# Patient Record
Sex: MALE | Race: WHITE | NOT HISPANIC OR LATINO | Employment: FULL TIME | URBAN - NONMETROPOLITAN AREA
[De-identification: names, ages, dates, MRNs, and addresses within clinical notes are randomized per-mention and may not be internally consistent; named-entity substitution may affect disease eponyms.]

---

## 2021-02-01 NOTE — PROGRESS NOTES
"  Assessment & Plan     Tobacco abuse    - nicotine (NICODERM CQ) 21 MG/24HR 24 hr patch; Place 1 patch onto the skin every 24 hours he will call when he is on the  refill and we can drop it down to 14 mg/day patch.    Lipid screening    - Lipid Profile (Chol, Trig, HDL, LDL calc); Future he will come in fasting lipid screening for hyperlipidemia and glucose screening for diabetes.    Screening for diabetes mellitus (DM)    - Glucose; Future    Preventative health care    - CBC with platelets and differential; Future also check a CBC with the labs.  Did discuss his BMI is 32.32.  We discussed diet exercise and making proper food choices and food portions.    Vasectomy evaluation    - UROLOGY ADULT REFERRAL; Future referral for urology for vasectomy.                       BMI:   Estimated body mass index is 32.32 kg/m  as calculated from the following:    Height as of this encounter: 1.854 m (6' 1\").    Weight as of this encounter: 111.1 kg (245 lb).                 SHAUNNA Shipley MD  [facility] [place] Hernandez is a 31 year old who presents to clinic today for the following health issues     HPI   Here to establish care.  He does not smoke but chews tobacco and does e-cigarettes would like to get some Nicorette gum to quit smoking.  Does see a dentist.  He would like to get labs done but he did eat today so come in fasting.  He does not drink alcohol.  He also requests to get a vasectomy.  He has 5 kids.  Overall he has been feeling well works as a  at the [employer].    New Patient/Transfer of Care      Vasectomy    Quit Nicotine     Review of Systems   Constitutional, HEENT, cardiovascular, pulmonary, gi and gu systems are negative, except as otherwise noted.      Objective    /64 (BP Location: Left arm, Patient Position: Sitting, Cuff Size: Adult Large)   Pulse 76   Temp 97.4  F (36.3  C) (Tympanic)   Ht 1.854 m (6' 1\")   Wt 111.1 kg (245 lb)   SpO2 98%   BMI " 32.32 kg/m    Body mass index is 32.32 kg/m .  Physical Exam   GENERAL: healthy, alert and no distress  EYES: Eyes grossly normal to inspection, PERRL and conjunctivae and sclerae normal  NECK: no adenopathy, no asymmetry, masses, or scars and thyroid normal to palpation  RESP: lungs clear to auscultation - no rales, rhonchi or wheezes  CV: regular rate and rhythm, normal S1 S2, no S3 or S4, no murmur, click or rub, no peripheral edema and peripheral pulses strong  ABDOMEN: soft, nontender, no hepatosplenomegaly, no masses and bowel sounds normal  MS: no gross musculoskeletal defects noted, no edema

## 2021-02-02 ENCOUNTER — OFFICE VISIT (OUTPATIENT)
Dept: FAMILY MEDICINE | Facility: OTHER | Age: 31
End: 2021-02-02
Attending: FAMILY MEDICINE
Payer: COMMERCIAL

## 2021-02-02 VITALS
HEIGHT: 73 IN | TEMPERATURE: 97.4 F | OXYGEN SATURATION: 98 % | HEART RATE: 76 BPM | BODY MASS INDEX: 32.47 KG/M2 | DIASTOLIC BLOOD PRESSURE: 64 MMHG | SYSTOLIC BLOOD PRESSURE: 108 MMHG | WEIGHT: 245 LBS

## 2021-02-02 DIAGNOSIS — Z00.00 PREVENTATIVE HEALTH CARE: ICD-10-CM

## 2021-02-02 DIAGNOSIS — Z13.1 SCREENING FOR DIABETES MELLITUS (DM): ICD-10-CM

## 2021-02-02 DIAGNOSIS — Z30.09 VASECTOMY EVALUATION: ICD-10-CM

## 2021-02-02 DIAGNOSIS — Z72.0 TOBACCO ABUSE: Primary | ICD-10-CM

## 2021-02-02 DIAGNOSIS — Z13.220 LIPID SCREENING: ICD-10-CM

## 2021-02-02 PROCEDURE — 99203 OFFICE O/P NEW LOW 30 MIN: CPT | Performed by: FAMILY MEDICINE

## 2021-02-02 RX ORDER — NICOTINE 21 MG/24HR
1 PATCH, TRANSDERMAL 24 HOURS TRANSDERMAL EVERY 24 HOURS
Qty: 30 PATCH | Refills: 1 | Status: SHIPPED | OUTPATIENT
Start: 2021-02-02 | End: 2021-05-17

## 2021-02-02 SDOH — HEALTH STABILITY: MENTAL HEALTH: HOW OFTEN DO YOU HAVE A DRINK CONTAINING ALCOHOL?: NOT ASKED

## 2021-02-02 SDOH — HEALTH STABILITY: MENTAL HEALTH: HOW MANY STANDARD DRINKS CONTAINING ALCOHOL DO YOU HAVE ON A TYPICAL DAY?: NOT ASKED

## 2021-02-02 SDOH — HEALTH STABILITY: MENTAL HEALTH: HOW OFTEN DO YOU HAVE 6 OR MORE DRINKS ON ONE OCCASION?: NOT ASKED

## 2021-02-02 ASSESSMENT — PAIN SCALES - GENERAL: PAINLEVEL: NO PAIN (0)

## 2021-02-02 ASSESSMENT — ANXIETY QUESTIONNAIRES
6. BECOMING EASILY ANNOYED OR IRRITABLE: NOT AT ALL
1. FEELING NERVOUS, ANXIOUS, OR ON EDGE: NOT AT ALL
5. BEING SO RESTLESS THAT IT IS HARD TO SIT STILL: NOT AT ALL
7. FEELING AFRAID AS IF SOMETHING AWFUL MIGHT HAPPEN: NOT AT ALL
2. NOT BEING ABLE TO STOP OR CONTROL WORRYING: NOT AT ALL
4. TROUBLE RELAXING: NOT AT ALL
3. WORRYING TOO MUCH ABOUT DIFFERENT THINGS: NOT AT ALL
GAD7 TOTAL SCORE: 0

## 2021-02-02 ASSESSMENT — MIFFLIN-ST. JEOR: SCORE: 2115.19

## 2021-02-02 ASSESSMENT — PATIENT HEALTH QUESTIONNAIRE - PHQ9: SUM OF ALL RESPONSES TO PHQ QUESTIONS 1-9: 0

## 2021-02-02 NOTE — NURSING NOTE
"Chief Complaint   Patient presents with     Establish Care       Initial /64 (BP Location: Left arm, Patient Position: Sitting, Cuff Size: Adult Large)   Pulse 76   Temp 97.4  F (36.3  C) (Tympanic)   Ht 1.854 m (6' 1\")   Wt 111.1 kg (245 lb)   SpO2 98%   BMI 32.32 kg/m   Estimated body mass index is 32.32 kg/m  as calculated from the following:    Height as of this encounter: 1.854 m (6' 1\").    Weight as of this encounter: 111.1 kg (245 lb).  Medication Reconciliation: complete  Chelsea De Souza LPN  "

## 2021-02-03 ASSESSMENT — ANXIETY QUESTIONNAIRES: GAD7 TOTAL SCORE: 0

## 2021-03-12 ENCOUNTER — OFFICE VISIT (OUTPATIENT)
Dept: UROLOGY | Facility: OTHER | Age: 31
End: 2021-03-12
Attending: FAMILY MEDICINE
Payer: COMMERCIAL

## 2021-03-12 VITALS
TEMPERATURE: 97.6 F | HEART RATE: 83 BPM | DIASTOLIC BLOOD PRESSURE: 68 MMHG | SYSTOLIC BLOOD PRESSURE: 122 MMHG | OXYGEN SATURATION: 97 % | RESPIRATION RATE: 16 BRPM

## 2021-03-12 DIAGNOSIS — Z30.09 VASECTOMY EVALUATION: ICD-10-CM

## 2021-03-12 PROCEDURE — 99203 OFFICE O/P NEW LOW 30 MIN: CPT | Performed by: UROLOGY

## 2021-03-12 ASSESSMENT — PAIN SCALES - GENERAL: PAINLEVEL: NO PAIN (0)

## 2021-03-12 NOTE — LETTER
3/12/2021       RE: David Toro  [address]  [place] [address]     Dear Colleague,    Thank you for referring your patient, David Toro, to the [facility] [place] [facility]. Please see a copy of my visit note below.      History     Chief Complaint:      Consult (vasectomy)      HPI   David Toro is a 31 year old male who presents for a vasectomy consultation.  Lino is 31 years of age and has 5 children.  He has a son who visits from a previous relationship and he has 4 children with his current Hartner.  They had a little girl a year ago and so now they feel their family is complete and he is desiring permanent sterilization at this point.  He has had no testicular problems, no testicular surgeries pain or infections.  He feels he do fine with an office procedure and has no concerns about that.    Allergies:    No Known Allergies     Medications:      nicotine (NICODERM CQ) 21 MG/24HR 24 hr patch        Problem List:      There are no active problems to display for this patient.       Past Medical History:      History reviewed. No pertinent past medical history.    Past Surgical History:      History reviewed. No pertinent surgical history.    Family History:      Family History   Problem Relation Age of Onset     Myocardial Infarction Father      Heart Disease Father        Social History:    Marital Status:   [2]  Social History     Tobacco Use     Smoking status: Former Smoker     Types: Cigarettes     Smokeless tobacco: Current User     Types: Chew     Tobacco comment: Ecig   Substance Use Topics     Alcohol use: Not Currently     Drug use: Not Currently        Review of Systems   All other systems reviewed and are negative.        Physical Exam   Vitals:  /68   Pulse 83   Temp 97.6  F (36.4  C) (Tympanic)   Resp 16   SpO2 97%       Physical Exam  Constitutional:       Appearance: Normal appearance.  He is normal weight.   Pulmonary:      Effort: Pulmonary effort is normal.   Abdominal:      General: There is no distension.      Palpations: Abdomen is soft.      Tenderness: There is no abdominal tenderness.   Genitourinary:     Comments: Penis is circumcised meatus glans shaft of the penis are normal.  The left testicle rides higher in the scrotum the testicle feels normal.  No supratesticular masses or inguinal hernias.  The vas can be identified and brought out to the skin.  The right testicle is smooth without any masses, supratesticular masses or inguinal hernias.  The right vas can be isolated and brought to the skin without any difficulty.  Neurological:      Mental Status: He is alert.         ImPression: Vasectomy encounter  Plan   Plan: I have gone over the procedure with him.  We discussed risks of bleeding, hematoma, infection, sperm granuloma and chronic orchialgia. Reanastomosis risk 1:1500. Increased risk of testicular infection.  I also discussed the fact that we send the specimen to pathology to identify the vas if there is a problem with the specimen then we would have to contact him and discuss further treatment.  He is still considered fertile until he brings in 2 - semen analyses and those instructions will be given at the time of the vasectomy.  He also has been given written information about this as well.  He appears to understand and wishes to proceed.         No follow-ups on file.    Anh Pandya MD  [facility] [place]              Again, thank you for allowing me to participate in the care of your patient.      Sincerely,    Anh Pandya MD

## 2021-03-12 NOTE — NURSING NOTE
"Chief Complaint   Patient presents with     Consult     vasectomy       Initial /68   Pulse 83   Temp 97.6  F (36.4  C) (Tympanic)   Resp 16   SpO2 97%  Estimated body mass index is 32.32 kg/m  as calculated from the following:    Height as of 2/2/21: 1.854 m (6' 1\").    Weight as of 2/2/21: 111.1 kg (245 lb).  Medication Reconciliation: complete     Review of Systems:    Weight loss:    No     Recent fever/chills:  No   Night sweats:   No  Current skin rash:  No   Recent hair loss:  No  Heat intolerance:  No   Cold intolerance:  No  Chest pain:   No   Palpitations:   No  Shortness of breath:  No   Wheezing:   No  Constipation:    No   Diarrhea:   No   Nausea:   No   Vomiting:   No   Kidney/side pain:  No   Back pain:   No  Frequent headaches:  No   Dizziness:     No  Leg swelling:   No   Calf pain:    No    Parents, brothers or sisters with history of kidney cancer:   No  Parents, brothers or sisters with history of bladder cancer: No    Leelee Mcintyre LPN    "

## 2021-03-12 NOTE — PROGRESS NOTES
History     Chief Complaint:      Consult (vasectomy)      HPI   David Toro is a 31 year old male who presents for a vasectomy consultation.  Lino is 31 years of age and has 5 children.  He has a son who visits from a previous relationship and he has 4 children with his current Hartner.  They had a little girl a year ago and so now they feel their family is complete and he is desiring permanent sterilization at this point.  He has had no testicular problems, no testicular surgeries pain or infections.  He feels he do fine with an office procedure and has no concerns about that.    Allergies:    No Known Allergies     Medications:      nicotine (NICODERM CQ) 21 MG/24HR 24 hr patch        Problem List:      There are no active problems to display for this patient.       Past Medical History:      History reviewed. No pertinent past medical history.    Past Surgical History:      History reviewed. No pertinent surgical history.    Family History:      Family History   Problem Relation Age of Onset     Myocardial Infarction Father      Heart Disease Father        Social History:    Marital Status:   [2]  Social History     Tobacco Use     Smoking status: Former Smoker     Types: Cigarettes     Smokeless tobacco: Current User     Types: Chew     Tobacco comment: Ecig   Substance Use Topics     Alcohol use: Not Currently     Drug use: Not Currently        Review of Systems   All other systems reviewed and are negative.        Physical Exam   Vitals:  /68   Pulse 83   Temp 97.6  F (36.4  C) (Tympanic)   Resp 16   SpO2 97%       Physical Exam  Constitutional:       Appearance: Normal appearance. He is normal weight.   Pulmonary:      Effort: Pulmonary effort is normal.   Abdominal:      General: There is no distension.      Palpations: Abdomen is soft.      Tenderness: There is no abdominal tenderness.   Genitourinary:     Comments: Penis is circumcised meatus glans shaft of the penis are  normal.  The left testicle rides higher in the scrotum the testicle feels normal.  No supratesticular masses or inguinal hernias.  The vas can be identified and brought out to the skin.  The right testicle is smooth without any masses, supratesticular masses or inguinal hernias.  The right vas can be isolated and brought to the skin without any difficulty.  Neurological:      Mental Status: He is alert.         ImPression: Vasectomy encounter  Plan   Plan: I have gone over the procedure with him.  We discussed risks of bleeding, hematoma, infection, sperm granuloma and chronic orchialgia. Reanastomosis risk 1:1500. Increased risk of testicular infection.  I also discussed the fact that we send the specimen to pathology to identify the vas if there is a problem with the specimen then we would have to contact him and discuss further treatment.  He is still considered fertile until he brings in 2 - semen analyses and those instructions will be given at the time of the vasectomy.  He also has been given written information about this as well.  He appears to understand and wishes to proceed.         No follow-ups on file.    Anh Pandya MD  [facility]

## 2021-04-27 DIAGNOSIS — Z30.2 ENCOUNTER FOR VASECTOMY: ICD-10-CM

## 2021-04-27 DIAGNOSIS — Z30.09 VASECTOMY EVALUATION: Primary | ICD-10-CM

## 2021-05-04 ENCOUNTER — OFFICE VISIT (OUTPATIENT)
Dept: UROLOGY | Facility: OTHER | Age: 31
End: 2021-05-04
Attending: UROLOGY
Payer: COMMERCIAL

## 2021-05-04 VITALS
OXYGEN SATURATION: 98 % | TEMPERATURE: 97.4 F | HEART RATE: 66 BPM | SYSTOLIC BLOOD PRESSURE: 100 MMHG | DIASTOLIC BLOOD PRESSURE: 60 MMHG | BODY MASS INDEX: 32.32 KG/M2 | WEIGHT: 245 LBS

## 2021-05-04 DIAGNOSIS — Z30.09 VASECTOMY EVALUATION: Primary | ICD-10-CM

## 2021-05-04 DIAGNOSIS — Z30.2 ENCOUNTER FOR VASECTOMY: ICD-10-CM

## 2021-05-04 LAB
ALBUMIN UR-MCNC: 10 MG/DL
APPEARANCE UR: CLEAR
BACTERIA #/AREA URNS HPF: ABNORMAL /HPF
BILIRUB UR QL STRIP: NEGATIVE
COLOR UR AUTO: YELLOW
GLUCOSE UR STRIP-MCNC: NEGATIVE MG/DL
HGB UR QL STRIP: NEGATIVE
KETONES UR STRIP-MCNC: NEGATIVE MG/DL
LEUKOCYTE ESTERASE UR QL STRIP: NEGATIVE
MUCOUS THREADS #/AREA URNS LPF: PRESENT /LPF
NITRATE UR QL: NEGATIVE
PH UR STRIP: 5.5 PH (ref 4.7–8)
RBC #/AREA URNS AUTO: <1 /HPF (ref 0–2)
SOURCE: ABNORMAL
SP GR UR STRIP: 1.03 (ref 1–1.03)
SQUAMOUS #/AREA URNS AUTO: 0 /HPF (ref 0–1)
UROBILINOGEN UR STRIP-MCNC: NORMAL MG/DL (ref 0–2)
WBC #/AREA URNS AUTO: 1 /HPF (ref 0–5)

## 2021-05-04 PROCEDURE — 55250 REMOVAL OF SPERM DUCT(S): CPT | Performed by: UROLOGY

## 2021-05-04 PROCEDURE — 88302 TISSUE EXAM BY PATHOLOGIST: CPT | Mod: TC,59 | Performed by: UROLOGY

## 2021-05-04 PROCEDURE — 81001 URINALYSIS AUTO W/SCOPE: CPT | Performed by: UROLOGY

## 2021-05-04 ASSESSMENT — PAIN SCALES - GENERAL: PAINLEVEL: NO PAIN (0)

## 2021-05-04 NOTE — NURSING NOTE
"Chief Complaint   Patient presents with     Sterilization     [facility] First         Initial /60 (BP Location: Left arm, Patient Position: Sitting, Cuff Size: Adult Large)   Pulse 66   Temp 97.4  F (36.3  C) (Tympanic)   Wt 111.1 kg (245 lb)   SpO2 98%   BMI 32.32 kg/m   Estimated body mass index is 32.32 kg/m  as calculated from the following:    Height as of 2/2/21: 1.854 m (6' 1\").    Weight as of this encounter: 111.1 kg (245 lb).  Medication Reconciliation: complete  Rhona Coleman LPN  "

## 2021-05-04 NOTE — PROGRESS NOTES
History     Chief Complaint:    Sterilization ([facility]  )      HPI   David Toro is a 31 year old male who presents for vasectomy.  David was counseled on 3/12/2021.  All of the risks and benefits of vasectomy were discussed at that time he has no further questions wishes to proceed.    Allergies:    No Known Allergies     Medications:      nicotine (NICODERM CQ) 21 MG/24HR 24 hr patch        Problem List:      There are no active problems to display for this patient.       Past Medical History:      History reviewed. No pertinent past medical history.    Past Surgical History:      History reviewed. No pertinent surgical history.    Family History:      Family History   Problem Relation Age of Onset     Myocardial Infarction Father      Heart Disease Father        Social History:    Marital Status:   [2]  Social History     Tobacco Use     Smoking status: Former Smoker     Types: Cigarettes     Quit date: 2/10/2021     Years since quittin.2     Smokeless tobacco: Current User     Types: Chew   Substance Use Topics     Alcohol use: Not Currently     Drug use: Not Currently        Review of Systems   All other systems reviewed and are negative.        Physical Exam   Vitals:  /60 (BP Location: Left arm, Patient Position: Sitting, Cuff Size: Adult Large)   Pulse 66   Temp 97.4  F (36.3  C) (Tympanic)   Wt 111.1 kg (245 lb)   SpO2 98%   BMI 32.32 kg/m        Physical Exam  Procedure:Preoperative diagnosis  Elective sterilization    Postoperative diagnosis  Elective sterilization    Procedure performed  Bilateral vasectomy    Surgeon  Anh Pandya MD    Anesthesia  8 mL lidocaine 2% local injection    Complications  None    EBL  <1 mL    Specimen  Left vas  Right vas    Indications  31 year old male agreed to undergo the above named procedure after discussion of the alternatives, risks and benefits.  Informed consent was obtained.      Procedure  The patient was brought to the  procedure room and placed in supine position.  His scrotum was prepped with Hibiclens and he was draped in a sterile fashion.  A timeout was performed.    Lidocaine 2% was used to anesthetize the scrotal skin as well as perivasal sheath initially on the patient's left.  A 1 cm skin incision was created with the scapel and a vas clamp utilized through this incision to grasp the vas sheath.  The left vas sheath was incised and the vas was dissected out to the skin surface and dissected free of its perivasal sheath.  The vas was clamp, transected and the lumen was cauterized both proximally and distally.  A 3-0 chromic suture was utilized to place the proximal vasal portion under the perivasal sheath, such that the 2 ends were divided by the perivasal sheath (fascial interposition).  This portion of the vas was then allowed to drop back into the left hemiscrotum.  Any bleeders were cauterized. The right side was treated next in the same manner as described above.  The skin incision was closed with the 3-0 chromic suture.  The patient tolerated the procedure well.    It was again reiterated to the patient that he would remain fertile for sometime and should present to the office for a post-vacectomy semen analysis to confirm sterility in 6 weeks and again in 3 months.  He should abstain from ejaculation for 10-14 days.  Ice bag generously.  Tylenol and ibuprofen and take it easy.  If he has any concerns he should contact our office. The patient again expressed understanding.    Impression: Vasectomy encounter    Plan   Plan: Patient was again encouraged to take it easy, ice bag as needed for discomfort.  He is still considered fertile until he brings in 2 - semen analyses and those instructions were given.  He is to call us if he has any concerns or problems.      No follow-ups on file.    Anh Pandya MD  [facility]

## 2021-05-09 LAB — COPATH REPORT: NORMAL

## 2021-06-20 ENCOUNTER — HEALTH MAINTENANCE LETTER (OUTPATIENT)
Age: 31
End: 2021-06-20

## 2021-06-23 ENCOUNTER — TELEPHONE (OUTPATIENT)
Dept: UROLOGY | Facility: OTHER | Age: 31
End: 2021-06-23

## 2021-06-23 NOTE — TELEPHONE ENCOUNTER
David would like a call regarding the sample cup for his 6 week after vasectomy sample.  Does he bring this in or need an appt to be seen.  Please call and advise.  Thank You!

## 2021-07-10 ENCOUNTER — HOSPITAL ENCOUNTER (EMERGENCY)
Facility: HOSPITAL | Age: 31
Discharge: HOME | End: 2021-07-10
Attending: NURSE PRACTITIONER | Admitting: NURSE PRACTITIONER
Payer: COMMERCIAL

## 2021-07-10 VITALS
SYSTOLIC BLOOD PRESSURE: 123 MMHG | TEMPERATURE: 97.5 F | RESPIRATION RATE: 20 BRPM | HEART RATE: 90 BPM | DIASTOLIC BLOOD PRESSURE: 68 MMHG | OXYGEN SATURATION: 95 %

## 2021-07-10 DIAGNOSIS — S05.02XA ABRASION OF LEFT CORNEA, INITIAL ENCOUNTER: ICD-10-CM

## 2021-07-10 PROCEDURE — 99213 OFFICE O/P EST LOW 20 MIN: CPT | Performed by: NURSE PRACTITIONER

## 2021-07-10 PROCEDURE — G0463 HOSPITAL OUTPT CLINIC VISIT: HCPCS

## 2021-07-10 PROCEDURE — 250N000009 HC RX 250: Performed by: NURSE PRACTITIONER

## 2021-07-10 RX ORDER — ERYTHROMYCIN 5 MG/G
0.5 OINTMENT OPHTHALMIC 4 TIMES DAILY
Qty: 3.5 G | Refills: 0 | Status: SHIPPED | OUTPATIENT
Start: 2021-07-10 | End: 2021-07-15

## 2021-07-10 RX ORDER — ERYTHROMYCIN 5 MG/G
OINTMENT OPHTHALMIC ONCE
Status: COMPLETED | OUTPATIENT
Start: 2021-07-10 | End: 2021-07-10

## 2021-07-10 RX ORDER — TETRACAINE HYDROCHLORIDE 5 MG/ML
1-2 SOLUTION OPHTHALMIC ONCE
Status: COMPLETED | OUTPATIENT
Start: 2021-07-10 | End: 2021-07-10

## 2021-07-10 RX ADMIN — TETRACAINE HYDROCHLORIDE 2 DROP: 5 SOLUTION OPHTHALMIC at 19:01

## 2021-07-10 RX ADMIN — ERYTHROMYCIN 1 G: 5 OINTMENT OPHTHALMIC at 19:29

## 2021-07-10 ASSESSMENT — ENCOUNTER SYMPTOMS
PHOTOPHOBIA: 0
LIGHT-HEADEDNESS: 0
VOMITING: 0
HEADACHES: 1
NAUSEA: 0
FEVER: 0
ACTIVITY CHANGE: 1
EYE PAIN: 1
DIZZINESS: 0
EYE DISCHARGE: 1
CHILLS: 0

## 2021-07-10 ASSESSMENT — VISUAL ACUITY
OU: 1
OS: 20/100
OD: 20/50

## 2021-07-10 NOTE — Clinical Note
David Toro was seen and treated in our emergency department on 7/10/2021.  He may return to work on 07/11/2021.  Evaluated in Urgent Care     If you have any questions or concerns, please don't hesitate to call.      Eugenia Palma, CNP

## 2021-07-10 NOTE — ED TRIAGE NOTES
Pt in for evaluation of left eye pain and tearing. Pt reports he was scratched in the area by a hand/fingernail.

## 2021-07-11 ENCOUNTER — HOSPITAL ENCOUNTER (EMERGENCY)
Facility: HOSPITAL | Age: 31
Discharge: HOME | End: 2021-07-11
Admitting: NURSE PRACTITIONER
Payer: COMMERCIAL

## 2021-07-11 VITALS
RESPIRATION RATE: 20 BRPM | DIASTOLIC BLOOD PRESSURE: 78 MMHG | HEART RATE: 89 BPM | TEMPERATURE: 97.3 F | SYSTOLIC BLOOD PRESSURE: 118 MMHG | OXYGEN SATURATION: 96 %

## 2021-07-11 DIAGNOSIS — S05.02XD ABRASION OF LEFT CORNEA, SUBSEQUENT ENCOUNTER: ICD-10-CM

## 2021-07-11 PROCEDURE — 250N000013 HC RX MED GY IP 250 OP 250 PS 637: Performed by: NURSE PRACTITIONER

## 2021-07-11 PROCEDURE — 250N000009 HC RX 250: Performed by: NURSE PRACTITIONER

## 2021-07-11 PROCEDURE — 99213 OFFICE O/P EST LOW 20 MIN: CPT | Performed by: NURSE PRACTITIONER

## 2021-07-11 PROCEDURE — G0463 HOSPITAL OUTPT CLINIC VISIT: HCPCS

## 2021-07-11 RX ORDER — HYDROCODONE BITARTRATE AND ACETAMINOPHEN 5; 325 MG/1; MG/1
1 TABLET ORAL EVERY 6 HOURS PRN
Qty: 12 TABLET | Refills: 0 | Status: SHIPPED | OUTPATIENT
Start: 2021-07-11 | End: 2021-07-23

## 2021-07-11 RX ORDER — TETRACAINE HYDROCHLORIDE 5 MG/ML
1-2 SOLUTION OPHTHALMIC ONCE
Status: COMPLETED | OUTPATIENT
Start: 2021-07-11 | End: 2021-07-11

## 2021-07-11 RX ORDER — HYDROCODONE BITARTRATE AND ACETAMINOPHEN 5; 325 MG/1; MG/1
2 TABLET ORAL ONCE
Status: COMPLETED | OUTPATIENT
Start: 2021-07-11 | End: 2021-07-11

## 2021-07-11 RX ADMIN — HYDROCODONE BITARTRATE AND ACETAMINOPHEN 2 TABLET: 5; 325 TABLET ORAL at 09:21

## 2021-07-11 RX ADMIN — TETRACAINE HYDROCHLORIDE 2 DROP: 5 SOLUTION OPHTHALMIC at 09:21

## 2021-07-11 ASSESSMENT — ENCOUNTER SYMPTOMS
VOMITING: 0
ACTIVITY CHANGE: 1
PHOTOPHOBIA: 0
LIGHT-HEADEDNESS: 0
FEVER: 0
NAUSEA: 0
DIZZINESS: 0
EYE PAIN: 1
CHILLS: 0
HEADACHES: 1
EYE DISCHARGE: 1

## 2021-07-11 NOTE — DISCHARGE INSTRUCTIONS
"Erythromycin ointment as prescribed 4 times daily for five days.  Wash hands before and after application of the eye medication.  If prescribed an ointment: gently pull the lower eye lid down and apply between 1/4th and 1/2 inch of the ointment. Then gently massage the eye to improve dispersement of the medication. The ointment will cause some blurring of the eye, initially, that will clear in a short time.  If applying eye drops keep the eye closed for 1 to 2 minutes after the eye drops have been applied. Do not rub the eyes.  Follow up with PCP or Ophthalmology if no improvement in 1-2 days  Return to ED/UC if symptoms increase or concerns develop such as those discussed and listed on the \"When to go the Emergency Room\" portion of your discharge instructions.           "

## 2021-07-11 NOTE — ED PROVIDER NOTES
History     Chief Complaint   Patient presents with     Eye Problem     HPI  David Toro is a 31 year old male who was evaluated per myself in urgent care yesterday he has a quite significant corneal abrasion.  See notes from 7/10/2021.  He presents today with left eye pain.  States he was feeling pretty good last night after he received the tetracaine eyedrops.  After the drops wore off he is having moderate to severe pain in the left eye even though he was taking ibuprofen and applying erythromycin eye ointment.  Accompanied with watery discharge and blurry vision in the right eye and a headache. Denies fevers, chills, nausea, vomiting, diarrhea, and shortness of breath.    Allergies:  No Known Allergies    Problem List:    There are no problems to display for this patient.       Past Medical History:    No past medical history on file.    Past Surgical History:    No past surgical history on file.    Family History:    Family History   Problem Relation Age of Onset     Myocardial Infarction Father      Heart Disease Father        Social History:  Marital Status:   [2]  Social History     Tobacco Use     Smoking status: Former Smoker     Types: Cigarettes     Quit date: 2/10/2021     Years since quittin.4     Smokeless tobacco: Current User     Types: Chew   Substance Use Topics     Alcohol use: Not Currently     Drug use: Not Currently        Medications:    HYDROcodone-acetaminophen (NORCO) 5-325 MG tablet  erythromycin (ROMYCIN) 5 MG/GM ophthalmic ointment          Review of Systems   Constitutional: Positive for activity change. Negative for chills and fever.   Eyes: Positive for pain, discharge and visual disturbance. Negative for photophobia.   Gastrointestinal: Negative for nausea and vomiting.   Neurological: Positive for headaches. Negative for dizziness and light-headedness.       Physical Exam   BP: 118/78  Pulse: 89  Temp: 97.3  F (36.3  C)  Resp: 20  SpO2: 96 %      Physical  Exam  Vitals and nursing note reviewed.   Constitutional:       General: He is in acute distress (Moderate to severe).   HENT:      Head: Normocephalic.   Eyes:      General: Lids are normal. Lids are everted, no foreign bodies appreciated. Vision grossly intact.         Left eye: Discharge (Watery) present.No foreign body.      Conjunctiva/sclera:      Left eye: Left conjunctiva is injected.     Cardiovascular:      Rate and Rhythm: Normal rate.   Pulmonary:      Effort: Pulmonary effort is normal.   Skin:     General: Skin is warm and dry.   Neurological:      Mental Status: He is alert and oriented to person, place, and time.   Psychiatric:         Behavior: Behavior normal.         ED Course        Procedures  left eyes examined with fluroescein stain and woods lamp, after tetracaine eye drops applied. Tetracaine relieved eye pain. No ulcer/foreign body/ferning/noted. 1.5 mm x 2.5 mm abrasion noted over iris and pupil.  Eyes were flushed with normal saline afterwards. Visual acuity per LPN             No results found for this or any previous visit (from the past 24 hour(s)).    Medications   HYDROcodone-acetaminophen (NORCO) 5-325 MG per tablet 2 tablet (2 tablets Oral Given 7/11/21 0921)   tetracaine (PONTOCAINE) 0.5 % ophthalmic solution 1-2 drop (2 drops Left Eye Given 7/11/21 0921)       Assessments & Plan (with Medical Decision Making)     I have reviewed the nursing notes.    I have reviewed the findings, diagnosis, plan and need for follow up with the patient.  (S05.02XD) Abrasion of left cornea, subsequent encounter  Comment: 31 year old male who was evaluated per myself in urgent care yesterday he has a quite significant corneal abrasion.  See notes from 7/10/2021.  He presents today with left eye pain.  States he was feeling pretty good last night after he received the tetracaine eyedrops.  After the drops wore off he is having moderate to severe pain in the left eye even though he was taking  ibuprofen and applying erythromycin eye ointment.  Accompanied with watery discharge and blurry vision in the right eye and a headache. Denies fevers, chills, nausea, vomiting, diarrhea, and shortness of breath.    MDM: right eye was again evaluated.  See procedure note the abrasion is mildly decreased from 2 mm x 3 mm to 1.5 mm x 2.5 mm.  Conjunctivae injected.      Plan: Hydrocodone 5/325 1 tablet every 6 hours as needed for pain. Education provided and/or discussed for this/these medication.  Previously given education for corneal abrasion.  ollow-up with eye doctor in the morning. [facility]. [phone]  Cool packs to affected eye.   Ibuprofen 600 to 800 mg (3 - 4 tabs of over the counter med) every six to eight hours as needed;not to exceed maximum amount of 3200 mg in 24 hours. Take with food.   For severe pain take two hydrocodone every four to six hours as needed. For moderate pain take one hydrocodone with 325 to 650 mg of acetaminophen (Tylenol). For mild pain take Tylenol 650 to 1000 mg every four to six hours as needed (not to exceed more than 4000 mg in a 24 hour period).  These discharge instructions and medications were reviewed with him and understanding verbalized.    This document was prepared using a combination of typing and voice generated software.  While every attempt was made for accuracy, spelling and grammatical errors may exist.    Discharge Medication List as of 7/11/2021  9:32 AM      START taking these medications    Details   HYDROcodone-acetaminophen (NORCO) 5-325 MG tablet Take 1 tablet by mouth every 6 hours as needed for severe pain, Disp-12 tablet, R-0, E-Prescribe             Final diagnoses:   Abrasion of left cornea, subsequent encounter       7/11/2021   [facility]       Eugenia Palma, CNP  07/13/21 5301

## 2021-07-11 NOTE — ED PROVIDER NOTES
History     Chief Complaint   Patient presents with     Eye Problem     HPI  David Toro is a 31 year old male who presents with left eye pain that occurred  Today when his 3-year-old unintentionally hit him in his eye with beads he was swinging.  Accompanied watery discharge, blurry vision. And a headache. Ibuprofen did relieve his headache.  Denies previous eye injury.  He did flush it with saline; this did not seem to improve his discomfort.  Quit smoking a few months ago.Denies fevers, chills, nausea, and vomiting.    Eye(s) Problem      Duration: couple hours ago    Description:  Location: left  Pain: YES  Redness: YES  Discharge: YES    Accompanying signs and symptoms: could not keep eye open    History (Trauma, foreign body exposure,): None    Precipitating or alleviating factors (contact use): None    Therapies tried and outcome: flushing with saline.      Allergies:  No Known Allergies    Problem List:    There are no active problems to display for this patient.       Past Medical History:    History reviewed. No pertinent past medical history.    Past Surgical History:    History reviewed. No pertinent surgical history.    Family History:    Family History   Problem Relation Age of Onset     Myocardial Infarction Father      Heart Disease Father        Social History:  Marital Status:   [2]  Social History     Tobacco Use     Smoking status: Former Smoker     Types: Cigarettes     Quit date: 2/10/2021     Years since quittin.4     Smokeless tobacco: Current User     Types: Chew   Substance Use Topics     Alcohol use: Not Currently     Drug use: Not Currently        Medications:    erythromycin (ROMYCIN) 5 MG/GM ophthalmic ointment          Review of Systems   Constitutional: Positive for activity change. Negative for chills and fever.   Eyes: Positive for pain, discharge and visual disturbance (blurry vision left eye). Negative for photophobia.   Gastrointestinal: Negative for nausea  and vomiting.   Neurological: Positive for headaches. Negative for dizziness and light-headedness.       Physical Exam   BP: 123/68  Pulse: 90  Temp: 97.5  F (36.4  C)  Resp: 20  SpO2: 95 %      Physical Exam  Vitals signs and nursing note reviewed.   Constitutional:       General: He is in acute distress (moderate).   Eyes:      General: Lids are normal. Lids are everted, no foreign bodies appreciated. Vision grossly intact.      Extraocular Movements: Extraocular movements intact.     Cardiovascular:      Rate and Rhythm: Normal rate.   Pulmonary:      Effort: Pulmonary effort is normal.   Skin:     General: Skin is warm and dry.   Neurological:      Mental Status: He is alert and oriented to person, place, and time.   Psychiatric:         Behavior: Behavior normal.         ED Course        Procedures    left eyes examined  with fluroescein stain and woods lamp, after tetracaine eye drops applied. Tetracaine relieved eye pain. No ulcer/foreign body/ferning noted.  2 mm x 3 mm abrasion noted over pupil and iris. Eyes were flushed with normal saline afterwards. Visual acuity per LPN           No results found for this or any previous visit (from the past 24 hour(s)).    Medications   tetracaine (PONTOCAINE) 0.5 % ophthalmic solution 1-2 drop (2 drops Left Eye Given 7/10/21 1901)   erythromycin (ROMYCIN) ophthalmic ointment (1 g Left Eye Given 7/10/21 1929)       Assessments & Plan (with Medical Decision Making)     I have reviewed the nursing notes.    I have reviewed the findings, diagnosis, plan and need for follow up with the patient.  (S05.02XA) Abrasion of left cornea, initial encounter  Comment: 31 year old male who presents with left eye pain that occurred  Today when his 3-year-old unintentionally hit him in his eye with beads he was swinging.  Accompanied watery discharge, blurry vision. And a headache. Ibuprofen did relieve his headache.  Denies previous eye injury.  He did flush it with saline; this did  "not seem to improve his discomfort.  Quit smoking a few months ago.Denies fevers, chills, nausea, and vomiting.    MDM: 2 mm x 3 mm abrasion left cornea. see eye procedure.    Plan: Erythromycin ointment as prescribed 4 times daily for five days. Education provided and/or discussed for this/these medication and for corneal abrasion.  Wash hands before and after application of the eye medication.  If prescribed an ointment: gently pull the lower eye lid down and apply between 1/4th and 1/2 inch of the ointment. Then gently massage the eye to improve dispersement of the medication. The ointment will cause some blurring of the eye, initially, that will clear in a short time.  If applying eye drops keep the eye closed for 1 to 2 minutes after the eye drops have been applied. Do not rub the eyes.  Follow up with PCP or Ophthalmology if no improvement in 1-2 days  Return to ED/UC if symptoms increase or concerns develop such as those discussed and listed on the \"When to go the Emergency Room\" portion of your discharge instructions.    These discharge instructions and medications were reviewed with him and understanding verbalized.     This document was prepared using a combination of typing and voice generated software.  While every attempt was made for accuracy, spelling and grammatical errors may exist.    Discharge Medication List as of 7/10/2021  7:16 PM      START taking these medications    Details   erythromycin (ROMYCIN) 5 MG/GM ophthalmic ointment Place 0.5 inches Into the left eye 4 times daily for 5 daysDisp-3.5 g, [id number]             Final diagnoses:   Abrasion of left cornea, initial encounter       7/10/2021   [facility] Urgent Care       Eugenia Palma, CNP  07/12/21 1128    "

## 2021-07-11 NOTE — ED TRIAGE NOTES
Pt in for evaluation of left eye pain. Was in the UC yesterday and sent home with abx ointment. Pain worsening and has been unable to sleep.

## 2021-07-11 NOTE — DISCHARGE INSTRUCTIONS
Follow-up with eye doctor in the morning. [facility] eye clinic. [phone]  Cool packs to affected eye.   Ibuprofen 600 to 800 mg (3 - 4 tabs of over the counter med) every six to eight hours as needed;not to exceed maximum amount of 3200 mg in 24 hours. Take with food.   For severe pain take two hydrocodone every four to six hours as needed. For moderate pain take one hydrocodone with 325 to 650 mg of acetaminophen (Tylenol). For mild pain take Tylenol 650 to 1000 mg every four to six hours as needed (not to exceed more than 4000 mg in a 24 hour period).

## 2021-07-13 ASSESSMENT — VISUAL ACUITY: OU: 1

## 2021-09-18 ENCOUNTER — MYC MEDICAL ADVICE (OUTPATIENT)
Dept: FAMILY MEDICINE | Facility: OTHER | Age: 31
End: 2021-09-18

## 2021-09-20 DIAGNOSIS — Z72.0 TOBACCO ABUSE: Primary | ICD-10-CM

## 2021-09-20 RX ORDER — NICOTINE 21 MG/24HR
1 PATCH, TRANSDERMAL 24 HOURS TRANSDERMAL EVERY 24 HOURS
Qty: 30 PATCH | Refills: 1 | Status: SHIPPED | OUTPATIENT
Start: 2021-09-20 | End: 2021-12-27

## 2021-10-11 ENCOUNTER — HEALTH MAINTENANCE LETTER (OUTPATIENT)
Age: 31
End: 2021-10-11

## 2021-12-27 ENCOUNTER — MYC MEDICAL ADVICE (OUTPATIENT)
Dept: FAMILY MEDICINE | Facility: OTHER | Age: 31
End: 2021-12-27
Payer: COMMERCIAL

## 2021-12-27 DIAGNOSIS — Z72.0 TOBACCO ABUSE: ICD-10-CM

## 2021-12-27 RX ORDER — NICOTINE 21 MG/24HR
1 PATCH, TRANSDERMAL 24 HOURS TRANSDERMAL EVERY 24 HOURS
Qty: 30 PATCH | Refills: 1 | Status: SHIPPED | OUTPATIENT
Start: 2021-12-27 | End: 2022-11-23

## 2021-12-27 NOTE — TELEPHONE ENCOUNTER
Patient requesting refill on medication provider out of clinic today, pended if you wish.  Chelsea De Souza LPN

## 2022-07-17 ENCOUNTER — HEALTH MAINTENANCE LETTER (OUTPATIENT)
Age: 32
End: 2022-07-17

## 2022-09-24 ENCOUNTER — HEALTH MAINTENANCE LETTER (OUTPATIENT)
Age: 32
End: 2022-09-24

## 2022-11-23 ENCOUNTER — OFFICE VISIT (OUTPATIENT)
Dept: FAMILY MEDICINE | Facility: OTHER | Age: 32
End: 2022-11-23
Attending: FAMILY MEDICINE
Payer: COMMERCIAL

## 2022-11-23 VITALS
SYSTOLIC BLOOD PRESSURE: 136 MMHG | RESPIRATION RATE: 18 BRPM | WEIGHT: 263.8 LBS | HEART RATE: 60 BPM | DIASTOLIC BLOOD PRESSURE: 80 MMHG | OXYGEN SATURATION: 97 % | HEIGHT: 75 IN | TEMPERATURE: 97.7 F | BODY MASS INDEX: 32.8 KG/M2

## 2022-11-23 DIAGNOSIS — Z82.49 FAMILY HISTORY OF ISCHEMIC HEART DISEASE: Primary | ICD-10-CM

## 2022-11-23 DIAGNOSIS — Z72.0 TOBACCO ABUSE: ICD-10-CM

## 2022-11-23 LAB
CHOLEST SERPL-MCNC: 226 MG/DL
HDLC SERPL-MCNC: 44 MG/DL
LDLC SERPL CALC-MCNC: 152 MG/DL
NONHDLC SERPL-MCNC: 182 MG/DL
TRIGL SERPL-MCNC: 150 MG/DL

## 2022-11-23 PROCEDURE — 80061 LIPID PANEL: CPT | Mod: ZL | Performed by: FAMILY MEDICINE

## 2022-11-23 PROCEDURE — 90471 IMMUNIZATION ADMIN: CPT | Performed by: FAMILY MEDICINE

## 2022-11-23 PROCEDURE — 90715 TDAP VACCINE 7 YRS/> IM: CPT | Performed by: FAMILY MEDICINE

## 2022-11-23 PROCEDURE — 36415 COLL VENOUS BLD VENIPUNCTURE: CPT | Mod: ZL | Performed by: FAMILY MEDICINE

## 2022-11-23 PROCEDURE — 99203 OFFICE O/P NEW LOW 30 MIN: CPT | Mod: 25 | Performed by: FAMILY MEDICINE

## 2022-11-23 RX ORDER — NICOTINE 21 MG/24HR
1 PATCH, TRANSDERMAL 24 HOURS TRANSDERMAL EVERY 24 HOURS
Qty: 30 PATCH | Refills: 1 | Status: SHIPPED | OUTPATIENT
Start: 2022-11-23 | End: 2023-01-18

## 2022-11-23 ASSESSMENT — PAIN SCALES - GENERAL: PAINLEVEL: NO PAIN (0)

## 2022-11-23 NOTE — PROGRESS NOTES
"  Assessment & Plan     Tobacco abuse    - nicotine (NICODERM CQ) 21 MG/24HR 24 hr patch; Place 1 patch onto the skin every 24 hours  - TDAP VACCINE (Adacel, Boostrix)  [[id number]]    Family history of ischemic heart disease  Patient does report a family history of coronary vascular disease in his father d having a heart attack in his 60s.  We will proceed with a lipid panel.  - Lipid Panel; Future  - Lipid Panel             BMI:   Estimated body mass index is 33.42 kg/m  as calculated from the following:    Height as of this encounter: 1.892 m (6' 2.5\").    Weight as of this encounter: 119.7 kg (263 lb 12.8 oz).           No follow-ups on file.    Alejandro Tolentino MD  [facility]    Pratik Hernandez is a 32 year old presenting for the following health issues:  Physical (Establish care, nicotine patch. )      Patient arrives here to establish care.  And NicoDerm refill.  Has been on in the past and reports good success.  Recently moved from [place] down to [place].  He states he was at to work.  He changed to e-cigarettes.  He is having a difficult time getting off his cigarettes.  Past medical history updated    Healthy Habits:     Taking medications regularly:  0    PHQ-2 Total Score: 0  History of Present Illness       Reason for visit:  Nicotine patches and general health    He eats 0-1 servings of fruits and vegetables daily.He consumes 2 sweetened beverage(s) daily.He exercises with enough effort to increase his heart rate 30 to 60 minutes per day.  He exercises with enough effort to increase his heart rate 4 days per week.   He is taking medications regularly.             Review of Systems         Objective    /80   Pulse 60   Temp 97.7  F (36.5  C)   Resp 18   Ht 1.892 m (6' 2.5\")   Wt 119.7 kg (263 lb 12.8 oz)   SpO2 97%   BMI 33.42 kg/m    Body mass index is 33.42 kg/m .  Physical Exam   GENERAL: healthy, alert and no distress  NECK: no adenopathy, no asymmetry, " masses, or scars and thyroid normal to palpation  RESP: lungs clear to auscultation - no rales, rhonchi or wheezes  CV: regular rate and rhythm, normal S1 S2, no S3 or S4, no murmur, click or rub, no peripheral edema and peripheral pulses strong  ABDOMEN: soft, nontender, no hepatosplenomegaly, no masses and bowel sounds normal  MS: no gross musculoskeletal defects noted, no edema

## 2022-11-23 NOTE — NURSING NOTE
"Chief Complaint   Patient presents with     Physical     Establish care, nicotine patch.        Initial /80   Pulse 60   Temp 97.7  F (36.5  C)   Resp 18   Ht 1.892 m (6' 2.5\")   Wt 119.7 kg (263 lb 12.8 oz)   SpO2 97%   BMI 33.42 kg/m   Estimated body mass index is 33.42 kg/m  as calculated from the following:    Height as of this encounter: 1.892 m (6' 2.5\").    Weight as of this encounter: 119.7 kg (263 lb 12.8 oz).  Medication Reconciliation: complete    Racquel Nguyen LPN    Advance Care Directive reviewed    "

## 2023-01-13 DIAGNOSIS — Z72.0 TOBACCO ABUSE: ICD-10-CM

## 2023-01-17 NOTE — TELEPHONE ENCOUNTER
"[facility] [address] [facility] [employer]  sent Rx request for the following:      Requested Prescriptions   Pending Prescriptions Disp Refills     nicotine (NICODERM CQ) 21 MG/24HR 24 hr patch [Pharmacy Med Name: NICOTINE 21 MG/24HR PATCH] 28 patch 1     Sig: PLACE 1 PATCH ONTO THE SKIN EVERY 24 HOURS.       Partial Cholinergic Nicotinic Agonist Agents Passed - 1/13/2023  2:59 PM        Passed - Blood pressure under 140/90 in past 12 months     BP Readings from Last 3 Encounters:   11/23/22 136/80   07/11/21 118/78   07/10/21 123/68           Passed - Recent (12 mo) or future (30 days) visit within the authorizing provider's specialty     Patient has had an office visit with the authorizing provider or a provider within the authorizing providers department within the previous 12 mos or has a future within next 30 days. See \"Patient Info\" tab in inbasket, or \"Choose Columns\" in Meds & Orders section of the refill encounter.          Passed - Medication is active on med list        Passed - Patient is 18 years of age or older     Last Prescription Date:   11/23/22  Last Fill Qty/Refills:         30, R-1  Last Office Visit:              11/23/22   Future Office visit:           None     Katrin Chavarria RN on 1/17/2023 at 3:42 PM      "

## 2023-01-18 RX ORDER — NICOTINE 21 MG/24HR
1 PATCH, TRANSDERMAL 24 HOURS TRANSDERMAL EVERY 24 HOURS
Qty: 28 PATCH | Refills: 1 | Status: SHIPPED | OUTPATIENT
Start: 2023-01-18 | End: 2023-03-22

## 2023-08-05 ENCOUNTER — HEALTH MAINTENANCE LETTER (OUTPATIENT)
Age: 33
End: 2023-08-05

## 2023-09-15 DIAGNOSIS — Z72.0 TOBACCO ABUSE: ICD-10-CM

## 2023-09-19 RX ORDER — NICOTINE 21 MG/24HR
1 PATCH, TRANSDERMAL 24 HOURS TRANSDERMAL EVERY 24 HOURS
Qty: 28 PATCH | Refills: 4 | Status: SHIPPED | OUTPATIENT
Start: 2023-09-19 | End: 2024-02-14

## 2023-09-19 NOTE — TELEPHONE ENCOUNTER
"[facility] [employer] sent Rx request for the following:      Requested Prescriptions   Pending Prescriptions Disp Refills    nicotine (NICODERM CQ) 21 MG/24HR 24 hr patch [Pharmacy Med Name: NICOTINE 21 MG/24HR PATCH] 28 patch 4     Sig: PLACE 1 PATCH ONTO THE SKIN EVERY 24 HOURS.       Partial Cholinergic Nicotinic Agonist Agents Passed - 9/15/2023  3:37 PM        Passed - Blood pressure under 140/90 in past 12 months     BP Readings from Last 3 Encounters:   11/23/22 136/80   07/11/21 118/78   07/10/21 123/68                 Passed - Recent (12 mo) or future (30 days) visit within the authorizing provider's specialty     Patient has had an office visit with the authorizing provider or a provider within the authorizing providers department within the previous 12 mos or has a future within next 30 days. See \"Patient Info\" tab in inbasket, or \"Choose Columns\" in Meds & Orders section of the refill encounter.              Passed - Medication is active on med list        Passed - Patient is 18 years of age or older             Last Prescription Date:   3/22/23  Last Fill Qty/Refills:         28 patch, R-4    Last Office Visit:              11/23/22   Future Office visit:           None scheduled    Chelsea Davis RN on 9/19/2023 at 10:52 AM         "

## 2024-02-13 DIAGNOSIS — Z72.0 TOBACCO ABUSE: ICD-10-CM

## 2024-02-14 RX ORDER — NICOTINE 21 MG/24HR
1 PATCH, TRANSDERMAL 24 HOURS TRANSDERMAL EVERY 24 HOURS
Qty: 28 PATCH | Refills: 2 | Status: SHIPPED | OUTPATIENT
Start: 2024-02-14

## 2024-02-14 NOTE — TELEPHONE ENCOUNTER
[id number] [employer] [facility] [place] sent Rx request for the following:      Requested Prescriptions   Pending Prescriptions Disp Refills    nicotine (NICODERM CQ) 21 MG/24HR 24 hr patch [Pharmacy Med Name: NICOTINE 21 MG/24HR PATCH] 28 patch 4     Sig: PLACE 1 PATCH ONTO THE SKIN EVERY 24 HOURS.       Partial Cholinergic Nicotinic Agonist Agents Failed - 2/14/2024 11:40 AM        Failed - Blood pressure under 140/90 in past 12 months     BP Readings from Last 3 Encounters:   11/23/22 136/80   07/11/21 118/78   07/10/21 123/68           Failed - Recent (12 mo) or future (90 days) visit within the authorizing provider's specialty     Last Prescription Date:   9/19/23  Last Fill Qty/Refills:         28, R-4    Last Office Visit:              11/23/22   Future Office visit:           None    Pt due for annual exam. Routing to provider for refill consideration. Routing to Unit scheduling pool, to assist Pt in scheduling appointment.     Unable to complete prescription refill per RN Medication Refill Policy.     Michelle Corley RN .............. 2/14/2024  11:46 AM

## 2024-03-04 ENCOUNTER — OFFICE VISIT (OUTPATIENT)
Dept: FAMILY MEDICINE | Facility: OTHER | Age: 34
End: 2024-03-04
Attending: FAMILY MEDICINE
Payer: COMMERCIAL

## 2024-03-04 VITALS
RESPIRATION RATE: 20 BRPM | TEMPERATURE: 97.1 F | BODY MASS INDEX: 28.6 KG/M2 | WEIGHT: 215.8 LBS | HEART RATE: 68 BPM | SYSTOLIC BLOOD PRESSURE: 114 MMHG | DIASTOLIC BLOOD PRESSURE: 68 MMHG | OXYGEN SATURATION: 95 % | HEIGHT: 73 IN

## 2024-03-04 DIAGNOSIS — R53.82 CHRONIC FATIGUE: ICD-10-CM

## 2024-03-04 DIAGNOSIS — R68.82 DECREASED SEX DRIVE: ICD-10-CM

## 2024-03-04 DIAGNOSIS — Z02.89 HEALTH EXAMINATION OF DEFINED SUBPOPULATION: Primary | ICD-10-CM

## 2024-03-04 PROCEDURE — 99395 PREV VISIT EST AGE 18-39: CPT | Performed by: FAMILY MEDICINE

## 2024-03-04 SDOH — HEALTH STABILITY: PHYSICAL HEALTH: ON AVERAGE, HOW MANY MINUTES DO YOU ENGAGE IN EXERCISE AT THIS LEVEL?: 60 MIN

## 2024-03-04 SDOH — HEALTH STABILITY: PHYSICAL HEALTH: ON AVERAGE, HOW MANY DAYS PER WEEK DO YOU ENGAGE IN MODERATE TO STRENUOUS EXERCISE (LIKE A BRISK WALK)?: 3 DAYS

## 2024-03-04 ASSESSMENT — SOCIAL DETERMINANTS OF HEALTH (SDOH): HOW OFTEN DO YOU GET TOGETHER WITH FRIENDS OR RELATIVES?: ONCE A WEEK

## 2024-03-04 ASSESSMENT — PAIN SCALES - GENERAL: PAINLEVEL: NO PAIN (0)

## 2024-03-04 NOTE — PROGRESS NOTES
"  SUBJECTIVE:   David Toro is a 34 year old male who presents to clinic today for the following health issues: Physical    Patient arrives here for physical.  He has been quite active lately.  Although reports quite a bit of fatigue.  He states that he is eating better exercising better.  Lost 18% of his weight.  He would like his cholesterol repeated.  But also reports low libido.  He states he is only interested in sex a couple times a month.  He has very little motivation first and intercourse.  Would like a testosterone level drawn          Patient Active Problem List    Diagnosis Date Noted    Chronic fatigue 03/04/2024     Priority: Medium    Tobacco abuse 11/23/2022     Priority: Medium     No past medical history on file.   No past surgical history on file.  No Known Allergies    Review of Systems     OBJECTIVE:     /68   Pulse 68   Temp 97.1  F (36.2  C)   Resp 20   Ht 1.854 m (6' 1\")   Wt 97.9 kg (215 lb 12.8 oz)   SpO2 95%   BMI 28.47 kg/m    Body mass index is 28.47 kg/m .  Physical Exam  Constitutional:       Appearance: Normal appearance.   HENT:      Head: Normocephalic and atraumatic.      Right Ear: Tympanic membrane normal.      Left Ear: Tympanic membrane normal.      Nose: No congestion.   Eyes:      Pupils: Pupils are equal, round, and reactive to light.   Cardiovascular:      Rate and Rhythm: Normal rate and regular rhythm.   Pulmonary:      Effort: Pulmonary effort is normal.      Breath sounds: Normal breath sounds.   Skin:     General: Skin is warm.   Neurological:      General: No focal deficit present.      Mental Status: He is alert.   Psychiatric:         Mood and Affect: Mood normal.         Thought Content: Thought content normal.         Diagnostic Test Results:  none     ASSESSMENT/PLAN:         (Z02.89) Health examination of defined subpopulation  (primary encounter diagnosis)  Comment: Satisfactory exam.  Will draw a lipid and BMP fasting  Plan: Basic " Metabolic Panel, Lipid Panel            (R68.82) Decreased sex drive  Comment: Testosterone level between 8 and 9:00  Plan: Testosterone, Total            (R53.82) Chronic fatigue  Comment: TSH and testosterone.  Plan: TSH Reflex GH, CBC and Differential              Alejandro Tolentino MD  [facility]

## 2024-03-04 NOTE — NURSING NOTE
Patient here for annual physical, last eye exam 2022 and last dental exam Feb 2024. Medication Reconciliation: complete.    Delaney Chavarria LPN  3/4/2024 11:33 AM

## 2024-03-11 ENCOUNTER — LAB (OUTPATIENT)
Dept: LAB | Facility: OTHER | Age: 34
End: 2024-03-11
Attending: FAMILY MEDICINE
Payer: COMMERCIAL

## 2024-03-11 DIAGNOSIS — R68.82 DECREASED SEX DRIVE: ICD-10-CM

## 2024-03-11 DIAGNOSIS — R53.82 CHRONIC FATIGUE: ICD-10-CM

## 2024-03-11 DIAGNOSIS — Z02.89 HEALTH EXAMINATION OF DEFINED SUBPOPULATION: ICD-10-CM

## 2024-03-11 LAB
ANION GAP SERPL CALCULATED.3IONS-SCNC: 9 MMOL/L (ref 7–15)
BASOPHILS # BLD AUTO: 0 10E3/UL (ref 0–0.2)
BASOPHILS NFR BLD AUTO: 0 %
BUN SERPL-MCNC: 10.7 MG/DL (ref 6–20)
CALCIUM SERPL-MCNC: 9.1 MG/DL (ref 8.6–10)
CHLORIDE SERPL-SCNC: 103 MMOL/L (ref 98–107)
CHOLEST SERPL-MCNC: 136 MG/DL
CREAT SERPL-MCNC: 0.96 MG/DL (ref 0.67–1.17)
DEPRECATED HCO3 PLAS-SCNC: 27 MMOL/L (ref 22–29)
EGFRCR SERPLBLD CKD-EPI 2021: >90 ML/MIN/1.73M2
EOSINOPHIL # BLD AUTO: 0 10E3/UL (ref 0–0.7)
EOSINOPHIL NFR BLD AUTO: 1 %
ERYTHROCYTE [DISTWIDTH] IN BLOOD BY AUTOMATED COUNT: 12.3 % (ref 10–15)
FASTING STATUS PATIENT QL REPORTED: YES
GLUCOSE SERPL-MCNC: 100 MG/DL (ref 70–99)
HCT VFR BLD AUTO: 43.1 % (ref 40–53)
HDLC SERPL-MCNC: 42 MG/DL
HGB BLD-MCNC: 14.5 G/DL (ref 13.3–17.7)
IMM GRANULOCYTES # BLD: 0 10E3/UL
IMM GRANULOCYTES NFR BLD: 0 %
LDLC SERPL CALC-MCNC: 79 MG/DL
LYMPHOCYTES # BLD AUTO: 1.9 10E3/UL (ref 0–5.3)
LYMPHOCYTES NFR BLD AUTO: 35 %
MCH RBC QN AUTO: 29.5 PG (ref 26.5–33)
MCHC RBC AUTO-ENTMCNC: 33.6 G/DL (ref 31.5–36.5)
MCV RBC AUTO: 88 FL (ref 78–100)
MONOCYTES # BLD AUTO: 0.5 10E3/UL (ref 0–1.3)
MONOCYTES NFR BLD AUTO: 9 %
NEUTROPHILS # BLD AUTO: 3 10E3/UL (ref 1.6–8.3)
NEUTROPHILS NFR BLD AUTO: 55 %
NONHDLC SERPL-MCNC: 94 MG/DL
NRBC # BLD AUTO: 0 10E3/UL
NRBC BLD AUTO-RTO: 0 /100
PLATELET # BLD AUTO: 194 10E3/UL (ref 150–450)
POTASSIUM SERPL-SCNC: 4.3 MMOL/L (ref 3.4–5.3)
RBC # BLD AUTO: 4.91 10E6/UL (ref 4.4–5.9)
SODIUM SERPL-SCNC: 139 MMOL/L (ref 135–145)
TRIGL SERPL-MCNC: 74 MG/DL
TSH SERPL DL<=0.005 MIU/L-ACNC: 2.22 UIU/ML (ref 0.3–4.2)
WBC # BLD AUTO: 5.4 10E3/UL (ref 4–11)

## 2024-03-11 PROCEDURE — 84403 ASSAY OF TOTAL TESTOSTERONE: CPT | Mod: ZL

## 2024-03-11 PROCEDURE — 82465 ASSAY BLD/SERUM CHOLESTEROL: CPT | Mod: ZL

## 2024-03-11 PROCEDURE — 85049 AUTOMATED PLATELET COUNT: CPT | Mod: ZL

## 2024-03-11 PROCEDURE — 36415 COLL VENOUS BLD VENIPUNCTURE: CPT | Mod: ZL

## 2024-03-11 PROCEDURE — 80048 BASIC METABOLIC PNL TOTAL CA: CPT | Mod: ZL

## 2024-03-11 PROCEDURE — 84443 ASSAY THYROID STIM HORMONE: CPT | Mod: ZL

## 2024-03-13 LAB — TESTOST SERPL-MCNC: 269 NG/DL (ref 240–950)

## 2024-10-10 ENCOUNTER — MYC REFILL (OUTPATIENT)
Dept: FAMILY MEDICINE | Facility: OTHER | Age: 34
End: 2024-10-10
Payer: COMMERCIAL

## 2024-10-10 DIAGNOSIS — Z72.0 TOBACCO ABUSE: ICD-10-CM

## 2024-10-15 RX ORDER — NICOTINE 21 MG/24HR
1 PATCH, TRANSDERMAL 24 HOURS TRANSDERMAL EVERY 24 HOURS
Qty: 28 PATCH | Refills: 2 | Status: SHIPPED | OUTPATIENT
Start: 2024-10-15

## 2024-10-15 NOTE — TELEPHONE ENCOUNTER
Requested Prescriptions   Pending Prescriptions Disp Refills    nicotine (NICODERM CQ) 21 MG/24HR 24 hr patch 28 patch 2     Sig: Place 1 patch onto the skin every 24 hours.   Last Prescription Date:   2/14/24  Last Fill Qty/Refills:         28 patches, R-2    Last Office Visit:              3/4/24 (Physical)  Future Office visit:           None    Prescription refilled per RN Medication Refill Policy.................... Michelle Corley RN ....................  10/15/2024   2:07 PM

## 2024-11-20 ENCOUNTER — OFFICE VISIT (OUTPATIENT)
Dept: FAMILY MEDICINE | Facility: OTHER | Age: 34
End: 2024-11-20
Attending: FAMILY MEDICINE
Payer: COMMERCIAL

## 2024-11-20 VITALS
SYSTOLIC BLOOD PRESSURE: 124 MMHG | BODY MASS INDEX: 29.84 KG/M2 | OXYGEN SATURATION: 97 % | RESPIRATION RATE: 20 BRPM | TEMPERATURE: 97.5 F | DIASTOLIC BLOOD PRESSURE: 80 MMHG | WEIGHT: 226.2 LBS | HEART RATE: 84 BPM

## 2024-11-20 DIAGNOSIS — Z72.0 TOBACCO ABUSE: ICD-10-CM

## 2024-11-20 DIAGNOSIS — Z98.52 S/P VASECTOMY: Primary | ICD-10-CM

## 2024-11-20 ASSESSMENT — PAIN SCALES - GENERAL: PAINLEVEL_OUTOF10: NO PAIN (0)

## 2024-11-20 NOTE — NURSING NOTE
Patient here for consult for vasectomy reversal . Medication Reconciliation: complete.    Delaney Chavarria LPN  11/20/2024 9:55 AM

## 2024-11-21 PROBLEM — Z98.52 S/P VASECTOMY: Status: ACTIVE | Noted: 2024-11-21

## 2024-11-21 RX ORDER — NICOTINE 21 MG/24HR
1 PATCH, TRANSDERMAL 24 HOURS TRANSDERMAL EVERY 24 HOURS
Qty: 28 PATCH | Refills: 2 | Status: SHIPPED | OUTPATIENT
Start: 2024-11-21

## 2024-11-21 NOTE — PROGRESS NOTES
Discussed vasectomy reversal  SUBJECTIVE:   David Toro is a 34 year old male who presents to clinic today for the following health issues: Patient arrives here to discuss vasectomy reversal.  In 2021 he had a vasectomy.  He and his significant other have decided that they wanted more children.  And would like to discuss this with somebody who does reversals    History of Present Illness       Reason for visit:  Vasectomy reversal   He is taking medications regularly.        Patient Active Problem List    Diagnosis Date Noted    S/P vasectomy 11/21/2024     Priority: Medium    Chronic fatigue 03/04/2024     Priority: Medium    Tobacco abuse 11/23/2022     Priority: Medium     No past medical history on file.   No past surgical history on file.    Review of Systems     OBJECTIVE:     /80   Pulse 84   Temp 97.5  F (36.4  C)   Resp 20   Wt 102.6 kg (226 lb 3.2 oz)   SpO2 97%   BMI 29.84 kg/m    Body mass index is 29.84 kg/m .  Physical Exam  Constitutional:       Appearance: Normal appearance.   Genitourinary:     Testes: Normal.   Neurological:      Mental Status: He is alert.     Vasectomy reversal    Urology    ASSESSMENT/PLAN:           ICD-10-CM    1. S/P vasectomy  Z98.52 Adult Urology  Referral      2. Tobacco abuse  Z72.0 nicotine (NICODERM CQ) 21 MG/24HR 24 hr patch        Referral to the [facility].  Patient would also like NicoDerm patches.  The longitudinal plan of care for the diagnosis(es)/condition(s) as documented were addressed during this visit. Due to the added complexity in care, I will continue to support David in the subsequent management and with ongoing continuity of care.    Alejandro Tolentino MD  [facility]

## 2025-02-03 ENCOUNTER — PATIENT OUTREACH (OUTPATIENT)
Dept: CARE COORDINATION | Facility: CLINIC | Age: 35
End: 2025-02-03
Payer: COMMERCIAL

## 2025-02-12 ENCOUNTER — OFFICE VISIT (OUTPATIENT)
Dept: UROLOGY | Facility: CLINIC | Age: 35
End: 2025-02-12
Attending: FAMILY MEDICINE
Payer: COMMERCIAL

## 2025-02-12 DIAGNOSIS — Z98.52 S/P VASECTOMY: ICD-10-CM

## 2025-02-12 NOTE — NURSING NOTE
David Toro's goals for this visit include:   Chief Complaint   Patient presents with    New Patient     Vasectomy reversal        He requests these members of his care team be copied on today's visit information:       PCP: Alejandro Tolentino    Referring Provider:  Alejandro Tolentino MD  [id number] [place] [address]    There were no vitals taken for this visit.    Do you need any medication refills at today's visit?     Bernadette Gross LPN on 2/12/2025 at 2:18 PM

## 2025-02-16 DIAGNOSIS — Z72.0 TOBACCO ABUSE: ICD-10-CM

## 2025-02-17 ENCOUNTER — PATIENT OUTREACH (OUTPATIENT)
Dept: CARE COORDINATION | Facility: CLINIC | Age: 35
End: 2025-02-17
Payer: COMMERCIAL

## 2025-02-17 ENCOUNTER — MYC REFILL (OUTPATIENT)
Dept: FAMILY MEDICINE | Facility: OTHER | Age: 35
End: 2025-02-17
Payer: COMMERCIAL

## 2025-02-17 DIAGNOSIS — Z72.0 TOBACCO ABUSE: ICD-10-CM

## 2025-02-17 RX ORDER — NICOTINE 21 MG/24HR
1 PATCH, TRANSDERMAL 24 HOURS TRANSDERMAL EVERY 24 HOURS
Qty: 28 PATCH | Refills: 2 | Status: SHIPPED | OUTPATIENT
Start: 2025-02-17

## 2025-02-17 NOTE — TELEPHONE ENCOUNTER
Pt sent Rx request for the following:     PT OUT OF MED!     Requested Prescriptions   Pending Prescriptions Disp Refills    nicotine (NICODERM CQ) 21 MG/24HR 24 hr patch 28 patch 2     Sig: Place 1 patch onto the skin every 24 hours.   Last Prescription Date:   11/21/24  Last Fill Qty/Refills:         28, R-2    Last Office Visit:              11/20/24 (Tobacco abuse discussed)  Future Office visit:           None    Prescription refilled per RN Medication Refill Policy.................... Michelle Corley RN ....................  2/17/2025   4:49 PM

## 2025-02-19 RX ORDER — NICOTINE 21 MG/24HR
1 PATCH, TRANSDERMAL 24 HOURS TRANSDERMAL EVERY 24 HOURS
Qty: 28 PATCH | Refills: 2 | OUTPATIENT
Start: 2025-02-19

## 2025-04-05 ENCOUNTER — HEALTH MAINTENANCE LETTER (OUTPATIENT)
Age: 35
End: 2025-04-05

## 2025-05-14 DIAGNOSIS — Z72.0 TOBACCO ABUSE: ICD-10-CM

## 2025-05-15 RX ORDER — NICOTINE 21 MG/24HR
1 PATCH, TRANSDERMAL 24 HOURS TRANSDERMAL EVERY 24 HOURS
Qty: 28 PATCH | Refills: 2 | Status: SHIPPED | OUTPATIENT
Start: 2025-05-15

## 2025-05-15 NOTE — TELEPHONE ENCOUNTER
[facility] in #[id number] in [employer] [facility] [place] sent Rx request for the following:      Requested Prescriptions   Pending Prescriptions Disp Refills    nicotine (NICODERM CQ) 21 MG/24HR 24 hr patch [Pharmacy Med Name: NICOTINE 21 MG/24HR PATCH] 28 patch 2     Sig: PLACE 1 PATCH ONTO THE SKIN EVERY 24 HOURS.   Last Prescription Date:   2/17/25  Last Fill Qty/Refills:         28, R-2    Last Office Visit:              11/20/24 (discussed)   Future Office visit:           None    Prescription refilled per RN Medication Refill Policy.................... Michelle Corley RN ....................  5/15/2025   10:44 AM

## 2025-08-03 DIAGNOSIS — Z72.0 TOBACCO ABUSE: ICD-10-CM

## 2025-08-06 ENCOUNTER — MYC REFILL (OUTPATIENT)
Dept: FAMILY MEDICINE | Facility: OTHER | Age: 35
End: 2025-08-06
Payer: COMMERCIAL

## 2025-08-06 DIAGNOSIS — Z72.0 TOBACCO ABUSE: ICD-10-CM

## 2025-08-06 RX ORDER — NICOTINE 21 MG/24HR
1 PATCH, TRANSDERMAL 24 HOURS TRANSDERMAL EVERY 24 HOURS
Qty: 28 PATCH | Refills: 2 | Status: CANCELLED | OUTPATIENT
Start: 2025-08-06

## 2025-08-07 RX ORDER — NICOTINE 21 MG/24HR
1 PATCH, TRANSDERMAL 24 HOURS TRANSDERMAL EVERY 24 HOURS
Qty: 28 PATCH | Refills: 2 | Status: SHIPPED | OUTPATIENT
Start: 2025-08-07